# Patient Record
Sex: FEMALE | Race: WHITE | Employment: STUDENT | URBAN - METROPOLITAN AREA
[De-identification: names, ages, dates, MRNs, and addresses within clinical notes are randomized per-mention and may not be internally consistent; named-entity substitution may affect disease eponyms.]

---

## 2024-05-03 ENCOUNTER — APPOINTMENT (OUTPATIENT)
Facility: HOSPITAL | Age: 34
End: 2024-05-03
Payer: OTHER GOVERNMENT

## 2024-05-03 ENCOUNTER — HOSPITAL ENCOUNTER (EMERGENCY)
Facility: HOSPITAL | Age: 34
Discharge: HOME | End: 2024-05-03
Attending: STUDENT IN AN ORGANIZED HEALTH CARE EDUCATION/TRAINING PROGRAM
Payer: OTHER GOVERNMENT

## 2024-05-03 VITALS
BODY MASS INDEX: 27.74 KG/M2 | TEMPERATURE: 98.5 F | HEART RATE: 82 BPM | SYSTOLIC BLOOD PRESSURE: 133 MMHG | RESPIRATION RATE: 18 BRPM | WEIGHT: 183 LBS | DIASTOLIC BLOOD PRESSURE: 82 MMHG | OXYGEN SATURATION: 98 % | HEIGHT: 68 IN

## 2024-05-03 DIAGNOSIS — M79.662 PAIN OF LEFT CALF: Primary | ICD-10-CM

## 2024-05-03 LAB — ECHO BSA: 2 M2

## 2024-05-03 PROCEDURE — 6370000000 HC RX 637 (ALT 250 FOR IP): Performed by: STUDENT IN AN ORGANIZED HEALTH CARE EDUCATION/TRAINING PROGRAM

## 2024-05-03 PROCEDURE — 99284 EMERGENCY DEPT VISIT MOD MDM: CPT

## 2024-05-03 PROCEDURE — 93971 EXTREMITY STUDY: CPT

## 2024-05-03 RX ORDER — IBUPROFEN 400 MG/1
800 TABLET ORAL
Status: COMPLETED | OUTPATIENT
Start: 2024-05-03 | End: 2024-05-03

## 2024-05-03 RX ADMIN — IBUPROFEN 800 MG: 400 TABLET, FILM COATED ORAL at 13:33

## 2024-05-03 ASSESSMENT — PAIN SCALES - GENERAL: PAINLEVEL_OUTOF10: 6

## 2024-05-03 NOTE — ED TRIAGE NOTES
Pt arrives to ed reporting left leg pain that began a week ago. Pt states swelling and redness to left calf. Instructed to come here to rule out blood clot by web md appt.

## 2024-05-07 NOTE — ED PROVIDER NOTES
[facility] EMERGENCY DEPT  EMERGENCY DEPARTMENT ENCOUNTER    Patient Name: Sarah Cline  MRN: [id number]  YOB: 1990  Provider: Linda Combs DO   PCP: No primary care provider on file.   Time/Date of evaluation: 10:09 PM EDT on 5/6/24    History of Presenting Illness     Chief Complaint   Patient presents with    Leg Pain       History Provided by: Patient   History is limited by: Nothing    HISTORY (Narative):   Sarah Cline is a 34 y.o. female with no reported PMH who presents to the emergency department via by POV complaining of left calf pain. States that it began 1 week ago. She recently moved to the area and has been doing a lot of heavy lifting / moving of boxes. Did [place] a [place]. She reports localized pain to the proximal posterior left calf with some bruising. She denies any chest pain, shortness of breath, lightheadedness, dizziness, palpitations, headache. Not on hormone therapy. No prior hx of clots. She has been walking up and down lots of stairs for this move. She called the  nurse advice line who advised she get checked out for a blood clot.     Nursing Notes were all reviewed and agreed with or any disagreements were addressed in the HPI.    Past History     PAST MEDICAL HISTORY:  No past medical history on file.    PAST SURGICAL HISTORY:  No past surgical history on file.    FAMILY HISTORY:  No family history on file.    SOCIAL HISTORY:       MEDICATIONS:  No current facility-administered medications for this encounter.     No current outpatient medications on file.       ALLERGIES:  Allergies   Allergen Reactions    Penicillins Hives       SOCIAL DETERMINANTS OF HEALTH:  Social Determinants of Health     Tobacco Use: Not on file   Alcohol Use: Not on file   Financial Resource Strain: Not on file   Food Insecurity: Not on file   Transportation Needs: Not on file   Physical Activity: Not on file   Stress: Not on file   Social Connections: Not on